# Patient Record
Sex: FEMALE | Race: WHITE | Employment: PART TIME
[De-identification: names, ages, dates, MRNs, and addresses within clinical notes are randomized per-mention and may not be internally consistent; named-entity substitution may affect disease eponyms.]

---

## 2024-05-01 ENCOUNTER — APPOINTMENT (OUTPATIENT)
Facility: HOSPITAL | Age: 69
End: 2024-05-01
Payer: MEDICARE

## 2024-05-01 ENCOUNTER — HOSPITAL ENCOUNTER (EMERGENCY)
Facility: HOSPITAL | Age: 69
Discharge: HOME | End: 2024-05-01
Payer: MEDICARE

## 2024-05-01 VITALS
HEART RATE: 63 BPM | DIASTOLIC BLOOD PRESSURE: 80 MMHG | OXYGEN SATURATION: 98 % | BODY MASS INDEX: 25.49 KG/M2 | RESPIRATION RATE: 14 BRPM | HEIGHT: 61 IN | TEMPERATURE: 97.5 F | SYSTOLIC BLOOD PRESSURE: 137 MMHG | WEIGHT: 135 LBS

## 2024-05-01 DIAGNOSIS — S61.210A LACERATION OF RIGHT INDEX FINGER WITHOUT FOREIGN BODY WITHOUT DAMAGE TO NAIL, INITIAL ENCOUNTER: Primary | ICD-10-CM

## 2024-05-01 PROCEDURE — 73140 X-RAY EXAM OF FINGER(S): CPT

## 2024-05-01 PROCEDURE — 99283 EMERGENCY DEPT VISIT LOW MDM: CPT

## 2024-05-01 PROCEDURE — 12001 RPR S/N/AX/GEN/TRNK 2.5CM/<: CPT

## 2024-05-01 ASSESSMENT — PAIN DESCRIPTION - LOCATION: LOCATION: FINGER (COMMENT WHICH ONE)

## 2024-05-01 ASSESSMENT — PAIN SCALES - GENERAL: PAINLEVEL_OUTOF10: 2

## 2024-05-01 ASSESSMENT — PAIN DESCRIPTION - ORIENTATION: ORIENTATION: RIGHT

## 2024-05-01 ASSESSMENT — PAIN - FUNCTIONAL ASSESSMENT: PAIN_FUNCTIONAL_ASSESSMENT: 0-10

## 2024-05-01 NOTE — ED PROVIDER NOTES
[facility] EMERGENCY DEPT  EMERGENCY DEPARTMENT ENCOUNTER         Pt Name: Elmira Brown  MRN: [id number]  Birthdate 1955  Date of evaluation: 5/1/2024  Provider: Thelma Renteria PA-C   PCP: Kaylene Weinberg MD  Note Started: 2:19 PM 5/1/24     CHIEF COMPLAINT       Chief Complaint   Patient presents with    Laceration        HISTORY OF PRESENT ILLNESS: 1 or more elements      History From: Patient  HPI Limitations: none     Elmira Brown is a 68 y.o. female who presents to the emergency department with a chief complaint of laceration to the right index finger over the knuckle onset just prior to arrival.  Patient reports that she scraped her finger against the corner of a metal cabinet.  Bleeding is controlled.  She denies any significant pain or numbness.    Tetanus UTD.     Nursing Notes were all reviewed and agreed with or any disagreements were addressed in the HPI.    PAST HISTORY     Past Medical History:  Past Medical History:   Diagnosis Date    GERD (gastroesophageal reflux disease)     Hyperlipidemia        Past Surgical History:  No past surgical history on file.    Family History:  No family history on file.    Social History:  Social History     Socioeconomic History    Marital status:    Tobacco Use    Smoking status: Never   Substance and Sexual Activity    Alcohol use: Never    Drug use: Never       Allergies:  Allergies   Allergen Reactions    Amoxicillin Hives    Codeine Itching       CURRENT MEDICATIONS      No current facility-administered medications for this encounter.     No current outpatient medications on file.          PHYSICAL EXAM      Vitals:    05/01/24 1337   BP: 137/80   Pulse: 63   Resp: 14   Temp: 97.5 °F (36.4 °C)   TempSrc: Oral   SpO2: 98%   Weight: 61.2 kg (135 lb)   Height: 1.549 m (5' 1\")     Physical Exam  Vitals and nursing note reviewed.   Constitutional:       General: She is not in acute distress.     Appearance: Normal appearance. She is not ill-appearing.   HENT:

## 2024-05-01 NOTE — DISCHARGE INSTRUCTIONS
Keep area clean and dry, elevate and ice as needed, suture removal in 10 to 14 days.  Can follow-up with PCP or ER.  Watch for any signs of infection to include any drainage, warmth to touch, significant redness or pain.